# Patient Record
(demographics unavailable — no encounter records)

---

## 2024-11-18 NOTE — HISTORY OF PRESENT ILLNESS
[FreeTextEntry1] : 11/18/24 - Pt reports mild SOB with exertion for 1 week, noted some cough on and off at rest. Pt denies CP, palpitations, or lightheadedness. Pt denies h/o syncope. Pt has latest BT on 10/29/24 showed , HDL 40, Triglycerides 121, .  She is on Metoprolol ER 25 mg, Olmesartan 20 mg, for HTN.  Her home SBP ranged 120-130. Today's /77 P 80, repeated /84 P 71.  Exam showed trace LE edema.  I advised patient to undergo an echocardiogram and a treadmill stress test.  I advised patient to undergo a CTA of coronary arteries.  I will obtain insurance authorization.     8/5/24- Patient reports HR of 130 lasting one hour on 5/14. Patient reports lower CP at night. She has trace edema. I advised patient to get CTA, patient declined. I advised patient to wear Holter monitor.  Patient wore a 7-day event monitor and it showed average HR of , rare APC's, rare PVC's, without significant arrhythmia.  12/21/23 - Patient reports home /70 this mornng. Patient denies CP, SOB, palpitations, or lightheadedness. Patient has no concerns currently and needs Metoprolol refills.   8/7/23 - Patient  reports palpitations  between 6am - 7:40am with HR of 138 that is fast and regular. Patient felt mild dizziness. It happened just once this year. Patient has trace edema.   2/9/23 - Pt reports no palpitations since last visit. Pt reports pain along diaphragm lasting a few minutes, which is not associated w. activities. Pt denies SOB.  10/3/22- Patient reports feeling okay but since April she had 2 episodes of  increased HR with BP dropping lasting 2-3 hours. I advised patient to see Doctor at the time to get ECG done or do a ECG on apple watch. Her BP is elevated. I advised patient to take Metoprolol ER 25 mg. I advised patient to wear a Holter monitor.  She had cardiac workup with Dr. Peralta in 10/2021.  She is on Olmesartan 20 mg for HTN.  6/29/22 with Dr. Garcia - on and off symptoms of  numbness of  digits, palpitations, and on and off dizziness with negative MRI of the head, chest, abdomen etc done in [facility] on 6-2-2022.  She has few occasions of  the mentioned symptoms in the last few years especially after the encounter of stress such as  's falls. The last one was few months ago.  At ordinary  condition, she has no chest pain, shortness of breath, dizziness or palpitations. She had study with  Holter ( 24 hours) and was negative. MRI of the heart was negative for disease as per her endorsement.

## 2024-11-18 NOTE — CARDIOLOGY SUMMARY
[de-identified] : ECG on 6-: possible JOSE, RSR WNL. [de-identified] : Report will be reviewed as she can provide.

## 2024-11-18 NOTE — REASON FOR VISIT
[FreeTextEntry1] : 76 year old female with HTN presents for followup.    Patient was last seen on 8/5/24- Patient reports HR of 130 lasting one hour on 5/14. Patient reports lower CP at night. She has trace edema. I advised patient to get CTA, patient declined. I advised patient to wear Holter monitor.  Patient wore a 7-day event monitor and it showed average HR of , rare APC's, rare PVC's, without significant arrhythmia.  She is on Metoprolol ER 25 mg, Olmesartan 20 mg, for HTN.

## 2025-03-17 NOTE — HISTORY OF PRESENT ILLNESS
[TextBox_4] : Ms. GALI MENG is a 77 year-old female who presents to the office today for initial pulmonary evaluation. Patient presents with regard to a dry cough which started about 6 weeks ago. Patient reports that she also has mild dyspnea. She denies any fevers, chills, sputum production wheeze, chest pain, orthopnea, or palpitations. Patient states that she recently had an unremarkable respiratory viral panel. She has been experiencing abdominal discomfort for which she recently underwent an abdominal CT scan; CT results shows hepatic lesions, numerous lytic lesions, mild atelectasis, and questionable lung nodule measuring up to 7mm.

## 2025-03-17 NOTE — SIGNATURES
[TextEntry] : This note was written by Monique Welch on 03/17/2025 acting as medical scribe for Dr. Mehcelle Hernandez. I, Dr. Mechelle Hernandez, have read and attest that all the information, medical decision making and discharge instructions within are true and accurate.

## 2025-03-17 NOTE — PROCEDURE
[FreeTextEntry1] : Pulmonary Function Test performed today, 03/17/2025, in my office: Spirometry: Within normal limits. Lung Volume: Within normal limits. Diffusion: Moderate impairment. ____________________________________________________________ NiOx 8

## 2025-03-17 NOTE — REVIEW OF SYSTEMS
[Cough] : cough [Dyspnea] : dyspnea [Negative] : Endocrine [TextBox_30] : dry cough and mild dyspnea

## 2025-03-17 NOTE — CONSULT LETTER
[Dear  ___] : Dear  [unfilled], [Courtesy Letter:] : I had the pleasure of seeing your patient, [unfilled], in my office today. [Please see my note below.] : Please see my note below. [Consult Closing:] : Thank you very much for allowing me to participate in the care of this patient.  If you have any questions, please do not hesitate to contact me. [Sincerely,] : Sincerely, [FreeTextEntry3] : Dr. Mechelle Hernandez

## 2025-03-17 NOTE — ASSESSMENT
[FreeTextEntry1] : The five potential causes of dyspnea discussed with KASH in office today. Dyspnea can be caused by a pulmonary issue, a cardiac issue, a vascularity issue, physical deconditioning or anxiety/depression.  Obtained and reviewed pulmonary function test and NiOx results with patient today. PFT showed moderate impairment and NiOx was unremarkable.  Dr. Hernandez reviewed with KASH her vitals in office today, showing normal O2 saturation at 94% on room air.    Dr. Hernandez's assessment: Based on abdominal CT scan results, Kash is likely suffering from metastatic end-stage cancer. Cough likely from bronchitis and GERD.  Dr. Hernandez's recommendation: At this point, Kash should start taking Asmanex  mcg/act HFA, two puffs twice daily for bronchitis. Kash should also start taking Famotidine 20 mg, once tablet twice daily, for GERD. Return for pulmonary follow up in 1 month.   Patient educated on the proper use of Asmanex and was strongly advised to rinse and gargle after taking to prevent oral thrush and/or hoarseness. Patient acknowledged understanding and competency in the procedure.  Strongly advised patient on obtaining Biopsy from skeletal or hepatic lesions to get definitive tissue diagnosis and for probable cancer staging.